# Patient Record
Sex: FEMALE | Race: WHITE | NOT HISPANIC OR LATINO | Employment: UNEMPLOYED | URBAN - METROPOLITAN AREA
[De-identification: names, ages, dates, MRNs, and addresses within clinical notes are randomized per-mention and may not be internally consistent; named-entity substitution may affect disease eponyms.]

---

## 2015-10-27 LAB — PAP RECOMMENDATION EXT: NORMAL

## 2019-05-28 LAB
ABO + RH BLD: NORMAL
HBV SURFACE AG SERPL QL IA: NEGATIVE
HCT VFR BLD AUTO: 40 % (ref 36–46)
HGB BLD-MCNC: 13 G/DL (ref 12–16)
HIV 1+2 AB+HIV1 P24 AG SERPL QL IA: NEGATIVE
INDIRECT COOMBS: NEGATIVE
RPR: NONREACTIVE
RUBELLA IMMUNE STATUS: NORMAL

## 2019-07-09 LAB
C TRACH RRNA SPEC QL PROBE: NEGATIVE
N GONORRHOEAE, AMPLIFIED DNA: NEGATIVE

## 2019-10-31 LAB
GLUCOSE SERPL-MCNC: 132 MG/DL
HCT VFR BLD AUTO: 34 % (ref 36–46)
HGB BLD-MCNC: 11 G/DL (ref 12–16)
INDIRECT COOMBS: NEGATIVE
RPR: NEGATIVE

## 2020-01-02 LAB — PRENATAL STREP B CULTURE: NEGATIVE

## 2020-01-08 ENCOUNTER — HOSPITAL ENCOUNTER (INPATIENT)
Facility: HOSPITAL | Age: 35
LOS: 2 days | Discharge: HOME | End: 2020-01-10
Attending: SPECIALIST | Admitting: SPECIALIST
Payer: COMMERCIAL

## 2020-01-08 DIAGNOSIS — Z37.9 NORMAL LABOR: ICD-10-CM

## 2020-01-08 LAB
ABO + RH BLD: NORMAL
AMPHET+METHAMPHET UR QL: NEGATIVE
BARBITURATES UR QL SCN>200 NG/ML: NEGATIVE
BASOPHILS # BLD AUTO: 0.05 K/UL (ref 0–0.2)
BASOPHILS NFR BLD: 0.3 % (ref 0–1.9)
BENZODIAZ UR QL SCN>200 NG/ML: NEGATIVE
BILIRUB UR QL STRIP: NEGATIVE
BLD GP AB SCN CELLS X3 SERPL QL: NORMAL
BZE UR QL SCN: NEGATIVE
CANNABINOIDS UR QL SCN: NEGATIVE
CLARITY UR: CLEAR
COLOR UR: COLORLESS
CREAT UR-MCNC: 17 MG/DL (ref 15–325)
CTP QC/QA: YES
DIFFERENTIAL METHOD: ABNORMAL
EOSINOPHIL # BLD AUTO: 0 K/UL (ref 0–0.5)
EOSINOPHIL NFR BLD: 0.2 % (ref 0–8)
ERYTHROCYTE [DISTWIDTH] IN BLOOD BY AUTOMATED COUNT: 14.1 % (ref 11.5–14.5)
GLUCOSE UR QL STRIP: NEGATIVE
HCT VFR BLD AUTO: 35.4 % (ref 37–48.5)
HGB BLD-MCNC: 11.4 G/DL (ref 12–16)
HGB UR QL STRIP: NEGATIVE
IMM GRANULOCYTES # BLD AUTO: 0.26 K/UL (ref 0–0.04)
IMM GRANULOCYTES NFR BLD AUTO: 1.6 % (ref 0–0.5)
KETONES UR QL STRIP: ABNORMAL
LEUKOCYTE ESTERASE UR QL STRIP: NEGATIVE
LYMPHOCYTES # BLD AUTO: 2.3 K/UL (ref 1–4.8)
LYMPHOCYTES NFR BLD: 14.3 % (ref 18–48)
MCH RBC QN AUTO: 27.7 PG (ref 27–31)
MCHC RBC AUTO-ENTMCNC: 32.2 G/DL (ref 32–36)
MCV RBC AUTO: 86 FL (ref 82–98)
MONOCYTES # BLD AUTO: 0.8 K/UL (ref 0.3–1)
MONOCYTES NFR BLD: 5 % (ref 4–15)
NEUTROPHILS # BLD AUTO: 12.7 K/UL (ref 1.8–7.7)
NEUTROPHILS NFR BLD: 78.6 % (ref 38–73)
NITRITE UR QL STRIP: NEGATIVE
NRBC BLD-RTO: 0 /100 WBC
OPIATES UR QL SCN: NEGATIVE
PCP UR QL SCN>25 NG/ML: NEGATIVE
PCP UR QL SCN>25 NG/ML: NEGATIVE
PH UR STRIP: 7 [PH] (ref 5–8)
PLATELET # BLD AUTO: 262 K/UL (ref 150–350)
PMV BLD AUTO: 11.4 FL (ref 9.2–12.9)
POC PH, VAGINAL: POSITIVE (ref 4.5–5.5)
PROT UR QL STRIP: NEGATIVE
RBC # BLD AUTO: 4.11 M/UL (ref 4–5.4)
RPR SER QL: NORMAL
RUPTURE OF MEMBRANE: POSITIVE
SP GR UR STRIP: 1 (ref 1–1.03)
TOXICOLOGY INFORMATION: NORMAL
URN SPEC COLLECT METH UR: ABNORMAL
UROBILINOGEN UR STRIP-ACNC: NEGATIVE EU/DL
WBC # BLD AUTO: 16.19 K/UL (ref 3.9–12.7)

## 2020-01-08 PROCEDURE — 80307 DRUG TEST PRSMV CHEM ANLYZR: CPT

## 2020-01-08 PROCEDURE — 12000002 HC ACUTE/MED SURGE SEMI-PRIVATE ROOM

## 2020-01-08 PROCEDURE — 25000003 PHARM REV CODE 250: Performed by: SPECIALIST

## 2020-01-08 PROCEDURE — 63600175 PHARM REV CODE 636 W HCPCS: Performed by: SPECIALIST

## 2020-01-08 PROCEDURE — 81003 URINALYSIS AUTO W/O SCOPE: CPT

## 2020-01-08 PROCEDURE — 86592 SYPHILIS TEST NON-TREP QUAL: CPT

## 2020-01-08 PROCEDURE — 86901 BLOOD TYPING SEROLOGIC RH(D): CPT

## 2020-01-08 PROCEDURE — 85025 COMPLETE CBC W/AUTO DIFF WBC: CPT

## 2020-01-08 RX ORDER — BUTORPHANOL TARTRATE 2 MG/ML
1 INJECTION INTRAMUSCULAR; INTRAVENOUS
Status: DISCONTINUED | OUTPATIENT
Start: 2020-01-08 | End: 2020-01-08

## 2020-01-08 RX ORDER — CETIRIZINE HYDROCHLORIDE 10 MG/1
10 TABLET ORAL DAILY
Status: ON HOLD | COMMUNITY
End: 2020-01-10 | Stop reason: HOSPADM

## 2020-01-08 RX ORDER — SODIUM CHLORIDE, SODIUM LACTATE, POTASSIUM CHLORIDE, CALCIUM CHLORIDE 600; 310; 30; 20 MG/100ML; MG/100ML; MG/100ML; MG/100ML
INJECTION, SOLUTION INTRAVENOUS CONTINUOUS
Status: DISCONTINUED | OUTPATIENT
Start: 2020-01-08 | End: 2020-01-08

## 2020-01-08 RX ORDER — ONDANSETRON 4 MG/1
8 TABLET, ORALLY DISINTEGRATING ORAL EVERY 8 HOURS PRN
Status: DISCONTINUED | OUTPATIENT
Start: 2020-01-08 | End: 2020-01-08

## 2020-01-08 RX ORDER — OXYTOCIN-SODIUM CHLORIDE 0.9% IV SOLN 30 UNIT/500ML 30-0.9/5 UT/ML-%
30 SOLUTION INTRAVENOUS ONCE
Status: COMPLETED | OUTPATIENT
Start: 2020-01-08 | End: 2020-01-08

## 2020-01-08 RX ORDER — CALCIUM CARBONATE 200(500)MG
500 TABLET,CHEWABLE ORAL 3 TIMES DAILY PRN
Status: DISCONTINUED | OUTPATIENT
Start: 2020-01-08 | End: 2020-01-08

## 2020-01-08 RX ORDER — BUTORPHANOL TARTRATE 2 MG/ML
2 INJECTION INTRAMUSCULAR; INTRAVENOUS
Status: DISCONTINUED | OUTPATIENT
Start: 2020-01-08 | End: 2020-01-08

## 2020-01-08 RX ORDER — FAMOTIDINE 20 MG/1
20 TABLET, FILM COATED ORAL 2 TIMES DAILY
Status: ON HOLD | COMMUNITY
End: 2020-01-10 | Stop reason: HOSPADM

## 2020-01-08 RX ORDER — SODIUM CHLORIDE 9 MG/ML
INJECTION, SOLUTION INTRAVENOUS
Status: DISCONTINUED | OUTPATIENT
Start: 2020-01-08 | End: 2020-01-08

## 2020-01-08 RX ORDER — OXYCODONE AND ACETAMINOPHEN 10; 325 MG/1; MG/1
1 TABLET ORAL EVERY 4 HOURS PRN
Status: DISCONTINUED | OUTPATIENT
Start: 2020-01-08 | End: 2020-01-10 | Stop reason: HOSPADM

## 2020-01-08 RX ORDER — ONDANSETRON 4 MG/1
8 TABLET, ORALLY DISINTEGRATING ORAL EVERY 8 HOURS PRN
Status: DISCONTINUED | OUTPATIENT
Start: 2020-01-08 | End: 2020-01-10 | Stop reason: HOSPADM

## 2020-01-08 RX ORDER — DIPHENHYDRAMINE HYDROCHLORIDE 50 MG/ML
25 INJECTION INTRAMUSCULAR; INTRAVENOUS EVERY 4 HOURS PRN
Status: DISCONTINUED | OUTPATIENT
Start: 2020-01-08 | End: 2020-01-10 | Stop reason: HOSPADM

## 2020-01-08 RX ORDER — OXYTOCIN-SODIUM CHLORIDE 0.9% IV SOLN 30 UNIT/500ML 30-0.9/5 UT/ML-%
2 SOLUTION INTRAVENOUS CONTINUOUS
Status: DISCONTINUED | OUTPATIENT
Start: 2020-01-08 | End: 2020-01-08

## 2020-01-08 RX ORDER — HYDROCORTISONE 25 MG/G
CREAM TOPICAL 3 TIMES DAILY PRN
Status: DISCONTINUED | OUTPATIENT
Start: 2020-01-08 | End: 2020-01-10 | Stop reason: HOSPADM

## 2020-01-08 RX ORDER — SIMETHICONE 80 MG
1 TABLET,CHEWABLE ORAL 4 TIMES DAILY PRN
Status: DISCONTINUED | OUTPATIENT
Start: 2020-01-08 | End: 2020-01-08

## 2020-01-08 RX ORDER — DOCUSATE SODIUM 100 MG/1
200 CAPSULE, LIQUID FILLED ORAL 2 TIMES DAILY PRN
Status: DISCONTINUED | OUTPATIENT
Start: 2020-01-08 | End: 2020-01-10 | Stop reason: HOSPADM

## 2020-01-08 RX ORDER — PROMETHAZINE HYDROCHLORIDE 25 MG/1
25 TABLET ORAL EVERY 6 HOURS PRN
Status: DISCONTINUED | OUTPATIENT
Start: 2020-01-08 | End: 2020-01-10 | Stop reason: HOSPADM

## 2020-01-08 RX ORDER — DIPHENHYDRAMINE HCL 25 MG
25 CAPSULE ORAL EVERY 4 HOURS PRN
Status: DISCONTINUED | OUTPATIENT
Start: 2020-01-08 | End: 2020-01-10 | Stop reason: HOSPADM

## 2020-01-08 RX ORDER — OXYCODONE AND ACETAMINOPHEN 5; 325 MG/1; MG/1
1 TABLET ORAL EVERY 4 HOURS PRN
Status: DISCONTINUED | OUTPATIENT
Start: 2020-01-08 | End: 2020-01-10 | Stop reason: HOSPADM

## 2020-01-08 RX ADMIN — SODIUM CHLORIDE, SODIUM LACTATE, POTASSIUM CHLORIDE, AND CALCIUM CHLORIDE: .6; .31; .03; .02 INJECTION, SOLUTION INTRAVENOUS at 05:01

## 2020-01-08 RX ADMIN — Medication 2 MILLI-UNITS/MIN: at 05:01

## 2020-01-08 RX ADMIN — IBUPROFEN 600 MG: 400 TABLET ORAL at 09:01

## 2020-01-08 RX ADMIN — Medication 30 UNITS: at 09:01

## 2020-01-08 NOTE — NURSING
1440:  Patient desires ncb, would like as little intervention and as much mobility at possible.  Magalis portable monitor not tracing well.  Notified Dr. Nunez of pt request.  Per Dr. Nunez patient may sit on labor ball at bedside to obtain 30min of tracing, then may do intermittent monitoring.      1552: discussed use of pitocin for augmentation of labor with patient.  Pt does not want pitocin at this time.      1515:  Risks and benefits of starting pitocin discussed with patient.  Pt desires to start pitocin with slow titration.  MD updated on POC.

## 2020-01-09 LAB
BASOPHILS # BLD AUTO: 0.04 K/UL (ref 0–0.2)
BASOPHILS NFR BLD: 0.2 % (ref 0–1.9)
DIFFERENTIAL METHOD: ABNORMAL
EOSINOPHIL # BLD AUTO: 0 K/UL (ref 0–0.5)
EOSINOPHIL NFR BLD: 0.1 % (ref 0–8)
ERYTHROCYTE [DISTWIDTH] IN BLOOD BY AUTOMATED COUNT: 14.1 % (ref 11.5–14.5)
HCT VFR BLD AUTO: 31.3 % (ref 37–48.5)
HGB BLD-MCNC: 9.9 G/DL (ref 12–16)
IMM GRANULOCYTES # BLD AUTO: 0.19 K/UL (ref 0–0.04)
IMM GRANULOCYTES NFR BLD AUTO: 0.9 % (ref 0–0.5)
LYMPHOCYTES # BLD AUTO: 2.3 K/UL (ref 1–4.8)
LYMPHOCYTES NFR BLD: 11.4 % (ref 18–48)
MCH RBC QN AUTO: 27.7 PG (ref 27–31)
MCHC RBC AUTO-ENTMCNC: 31.6 G/DL (ref 32–36)
MCV RBC AUTO: 87 FL (ref 82–98)
MONOCYTES # BLD AUTO: 1.1 K/UL (ref 0.3–1)
MONOCYTES NFR BLD: 5.5 % (ref 4–15)
NEUTROPHILS # BLD AUTO: 16.7 K/UL (ref 1.8–7.7)
NEUTROPHILS NFR BLD: 81.9 % (ref 38–73)
NRBC BLD-RTO: 0 /100 WBC
PLATELET # BLD AUTO: 229 K/UL (ref 150–350)
PMV BLD AUTO: 11.2 FL (ref 9.2–12.9)
RBC # BLD AUTO: 3.58 M/UL (ref 4–5.4)
WBC # BLD AUTO: 20.34 K/UL (ref 3.9–12.7)

## 2020-01-09 PROCEDURE — 63600175 PHARM REV CODE 636 W HCPCS: Performed by: SPECIALIST

## 2020-01-09 PROCEDURE — 36415 COLL VENOUS BLD VENIPUNCTURE: CPT

## 2020-01-09 PROCEDURE — 25000003 PHARM REV CODE 250: Performed by: SPECIALIST

## 2020-01-09 PROCEDURE — 12000002 HC ACUTE/MED SURGE SEMI-PRIVATE ROOM

## 2020-01-09 PROCEDURE — 72200004 HC VAGINAL DELIVERY LEVEL I

## 2020-01-09 PROCEDURE — 85025 COMPLETE CBC W/AUTO DIFF WBC: CPT

## 2020-01-09 RX ORDER — SIMETHICONE 80 MG
1 TABLET,CHEWABLE ORAL 3 TIMES DAILY PRN
Status: DISCONTINUED | OUTPATIENT
Start: 2020-01-09 | End: 2020-01-10 | Stop reason: HOSPADM

## 2020-01-09 RX ADMIN — OXYCODONE HYDROCHLORIDE AND ACETAMINOPHEN 1 TABLET: 10; 325 TABLET ORAL at 06:01

## 2020-01-09 RX ADMIN — ALUMINUM HYDROXIDE, MAGNESIUM HYDROXIDE, AND SIMETHICONE 50 ML: 200; 200; 20 SUSPENSION ORAL at 10:01

## 2020-01-09 RX ADMIN — BENZOCAINE AND LEVOMENTHOL: 200; 5 SPRAY TOPICAL at 12:01

## 2020-01-09 RX ADMIN — DOCUSATE SODIUM 200 MG: 100 CAPSULE, LIQUID FILLED ORAL at 10:01

## 2020-01-09 RX ADMIN — OXYCODONE AND ACETAMINOPHEN 1 TABLET: 5; 325 TABLET ORAL at 05:01

## 2020-01-09 RX ADMIN — IBUPROFEN 600 MG: 400 TABLET ORAL at 05:01

## 2020-01-09 RX ADMIN — Medication: at 12:01

## 2020-01-09 RX ADMIN — IBUPROFEN 600 MG: 400 TABLET ORAL at 10:01

## 2020-01-09 RX ADMIN — IBUPROFEN 600 MG: 400 TABLET ORAL at 03:01

## 2020-01-09 NOTE — NURSING
Medicated with Oxycodone 10mg/325mg.Patient sitting up in bed. Reports that she no longer feels pain in chest at this time.Will continue to monitor.

## 2020-01-09 NOTE — NURSING
"Patient reports pain in chest.Also c/o abdominal cramping.She states"It feels like you didn't swallow a pill".Denies SOB. BP= 112/77  P=100 Pulse OX =98% Respirations=20. Oncoming nurse aware.  "

## 2020-01-09 NOTE — PLAN OF CARE
Patient awake/alert. Medicated x 1 this shift for pain. Bleeding light. Ambulating and voidng without difficulty. Plan of care continued.

## 2020-01-09 NOTE — L&D DELIVERY NOTE
[facility]  Vaginal Delivery Note    SUMMARY     Patient underwent normal spontaneous vaginal delivery over an intact perineum with first-degree vaginal laceration with repair.  The placenta was delivered intact and spontaneous.  The uterus and vagina were swept clean. The patient had an epidural for anesthesia . The estimated blood loss was 200 cc.  No other complications were noted. Baby delivered was a female infant with Apgar 8 9. .  Patient tolerated delivery well. Sponge needle and lap counted correctly x2. Both mother and baby are in stable condition.        Indications: Vaginal delivery  Pregnancy complicated by:   Patient Active Problem List   Diagnosis    Normal labor    Vaginal delivery     Admitting GA: 37w4d

## 2020-01-09 NOTE — PROGRESS NOTES
"  Postpartum Day 1 Vaginal Delivery    Bernarda Chandler is doing well without complaints. She denies any problems with pp blues, is ambulating well, is voiding without difficulty, and states bleeding is not heavy. Her pain level is being controlled with pain meds.      OBJECTIVE:     Vital Signs (Most Recent):  /72 (BP Location: Left arm, Patient Position: Sitting)   Pulse 84   Temp 98 °F (36.7 °C) (Oral)   Resp 18   Ht 5' 1" (1.549 m)   Wt 76.2 kg (168 lb)   Breastfeeding? Yes   BMI 31.74 kg/m²       Vital Signs Range (Last 24H):  Reviewed    I & O (Last 24H):  Intake/Output Summary (Last 24 hours)    Intake/Output Summary (Last 24 hours) at 1/9/2020 1333  Last data filed at 1/9/2020 0322  Gross per 24 hour   Intake 75 ml   Output 1550 ml   Net -1475 ml         Physical Exam:    Abd: soft non tender, fundus firm   Ext: negative radha's sign, minimal edema  Lochia: appropriate amount rubra      CBC:   Lab Results   Component Value Date/Time    WBC 20.34 (H) 01/09/2020 04:17 AM    RBC 3.58 (L) 01/09/2020 04:17 AM    HGB 9.9 (L) 01/09/2020 04:17 AM    HCT 31.3 (L) 01/09/2020 04:17 AM     01/09/2020 04:17 AM    MCV 87 01/09/2020 04:17 AM    MCH 27.7 01/09/2020 04:17 AM    MCHC 31.6 (L) 01/09/2020 04:17 AM       ABO/Rh  O POS          ASSESSMENT/PLAN:     S/p vaginal delivery   Patient Active Problem List   Diagnosis    Normal labor    Vaginal delivery        Routine postpartal care     "

## 2020-01-09 NOTE — LACTATION NOTE
This note was copied from a baby's chart.  Mom reports breastfeeding well. Denies any ?/concerns or assistance @ this time. Dicussed feeding cues & feeding frequency (8-12 times in 24 hours) Assistance offered prn. Mom verbalized understanding

## 2020-01-09 NOTE — PLAN OF CARE
Met with patient to complete OB initial discharge planning assessment. Patient lives with , 4 children and now baby. Patient has access to a car seat and plans to breast  feed. Patient has access to diapers and understands process of how to apply for WIC. Patient stated they have no needs from /case management staff at this time.       01/09/20 1406   Discharge Assessment   Assessment Type Discharge Planning Assessment   Confirmed/corrected address and phone number on facesheet? Yes   Assessment information obtained from? Patient   Current cognitive status: Alert/Oriented   Current Functional Status: Independent   Lives With spouse;child(dasia), dependent   Able to Return to Prior Arrangements yes   Is patient able to care for self after discharge? Yes   Patient currently being followed by outpatient case management? No   Patient currently receives any other outside agency services? No   Do you have any problems affording any of your prescribed medications? No   Does the patient have transportation home? Yes   Transportation Anticipated family or friend will provide   Does the patient receive services at the [facility]? No   Discharge Plan A Home   Discharge Plan B Home   Patient/Family in Agreement with Plan yes

## 2020-01-10 VITALS
TEMPERATURE: 99 F | WEIGHT: 168 LBS | DIASTOLIC BLOOD PRESSURE: 85 MMHG | SYSTOLIC BLOOD PRESSURE: 124 MMHG | BODY MASS INDEX: 31.72 KG/M2 | HEART RATE: 76 BPM | HEIGHT: 61 IN | OXYGEN SATURATION: 97 % | RESPIRATION RATE: 18 BRPM

## 2020-01-10 PROCEDURE — 25000003 PHARM REV CODE 250: Performed by: SPECIALIST

## 2020-01-10 RX ORDER — IBUPROFEN 600 MG/1
600 TABLET ORAL EVERY 6 HOURS PRN
Refills: 0
Start: 2020-01-10 | End: 2022-04-21

## 2020-01-10 RX ADMIN — IBUPROFEN 600 MG: 400 TABLET ORAL at 08:01

## 2020-01-10 NOTE — PROGRESS NOTES
"                                                                                     Postpartum Day 2 Vaginal Delivery    Bernarda Chandler is doing well without complaints. She denies any problems with pp blues, is ambulating well, is voiding without difficulty, and states bleeding is not heavy. Her pain level is being controlled with pain meds.      OBJECTIVE:     Vital Signs (Most Recent):  /83 (BP Location: Left arm, Patient Position: Sitting)   Pulse 82   Temp 98.5 °F (36.9 °C) (Oral)   Resp 18   Ht 5' 1" (1.549 m)   Wt 76.2 kg (168 lb)   SpO2 97%   Breastfeeding? Yes   BMI 31.74 kg/m²       Vital Signs Range (Last 24H):  Reviewed    I & O (Last 24H):  Intake/Output Summary (Last 24 hours)  No intake or output data in the 24 hours ending 01/10/20 1009      Physical Exam:    Abd: soft non tender, fundus firm   Ext: negative radha's sign, minimal edema  Lochia: appropriate amount rubra  Perineum: healing well    CBC:   Lab Results   Component Value Date/Time    WBC 20.34 (H) 01/09/2020 04:17 AM    RBC 3.58 (L) 01/09/2020 04:17 AM    HGB 9.9 (L) 01/09/2020 04:17 AM    HCT 31.3 (L) 01/09/2020 04:17 AM     01/09/2020 04:17 AM    MCV 87 01/09/2020 04:17 AM    MCH 27.7 01/09/2020 04:17 AM    MCHC 31.6 (L) 01/09/2020 04:17 AM       ABO/Rh  O POS     S/p vag delivery   Patient Active Problem List   Diagnosis    Normal labor    Vaginal delivery          D/c home  See discharge orders  Follow up 4-6 weeks  Emergency room precautions  "

## 2020-01-10 NOTE — LACTATION NOTE
"States, "she is doing really well best I can tell". Denies any pain/discomfor/questions/concerns at present. History of breastfeeding first child x 1 year. Offered assist with breastfeeding/concerns/questions as needed/desired. V/u. Will f/u prn.   "

## 2020-01-10 NOTE — DISCHARGE INSTRUCTIONS
Pelvic rest for 6 weeks (no sex, tampons, douching, nothing in the vagina)    You can experience vaginal bleeding on and off for up to 6 weeks, it will gradually get lighter and the color will change from bright red to a brownish discharge towards the end.    Activity:  NO strenuous activities or exercising.  Do not /lift anything over 15 pounds.   Do not do heavy housework or cleaning.    NO driving for 3 days.  You may take short car trips but do not drive.    You may shower and/or soak in a bathtub, both are acceptable.  Use a mild soap, no heavy perfumes or fragrances to avoid irritation.     If constipation develops:  You may take Colace (stool softener), Milk of Magnesia, Dulcolax or Miralax.  All of these medications are sold over the counter.      Care of Episiotomy:  Local agents such as Tucks pads & Dermoplast spray.  You may also use a Sitz bath: sitting in a tub of warm water for 15 minutes 2-3 times per day will help relieve the discomfort.      Pain Relief:  You may take Motrin for mild pain & uterine cramping.      Emotional Changes:  You may experience baby blues after delivery.  You may feel let down, anxious and cry easily.  This is normal.  These feelings can begin 2-3 days after delivery and usually disappear in about a week or two.  Prolonged sadness may indicate postpartum depression.      Call your doctor for any of the following:  Difficulty breathing, problems with any of your medications, inability to eat.    Foul smelling vaginal discharge.  Temperature above 100.4.    Heavy vaginal bleeding.  All women bleed different after delivery and each delivery is different.  Heavy bleeding consists of saturating a kaylie pad in a 1 hour time period.  Passing clots are normal, if you pass a blood clot larger than the size of a golf ball call your doctor's office.   If you experience pain in your legs/calves, if one leg increases in size and becomes swollen or becomes hot to touch or  discolored.   Crying or periods of sadness beyond 2 weeks.      If you are breast feeding:  Wash your breasts with mild soap and warm water.  You should wear a supportive bra.  You should continue to take a prenatal vitamin for 6 weeks or until breastfeeding is discontinued.  If nipples are sore, apply a few drops of breast milk after nursing and let air dry or you can use Lanolin cream.   If breasts are engorged, apply warmth and express milk.           Breastfeeding Discharge Instructions       [facility] [phone]     American Academy of Pediatrics recommends exclusive breastfeeding for the first 6 months of life and continued breastfeeding with the introduction of supplemental foods beyond the first year of life.   The World Health Organization and the American Academy of Pediatrics recommend to delay all bottle and pacifier use until after 4 weeks of age and breastfeeding is well established.  American Academy of Pediatrics does recommend the use of a pacifier at naptime and bedtime, as a SIDS Reduction strategy, for  newborns only after 1 month of age and breastfeeding has been firmly established.    Feed the baby at the earliest sign of hunger or comfort  o Hands to mouth, sucking motions  o Rooting or searching for something to suck on  o Dont wait for crying - it is a not a late sign of hunger; it is a sign of distress     The feedings may be 8-12 times per 24hrs and will not follow a schedule   Alternate the breast you start the feeding with, or start with the breast that feels the fullest   Switch breasts when the baby takes himself off the breast or falls asleep   Keep offering breasts until the baby looks full, no longer gives hunger signs, and stays asleep when placed on his back in the crib   If the baby is sleepy and wont wake for a feeding, put the baby skin-to-skin dressed in a diaper against the mothers bare chest   Sleep near  your baby   The baby should be positioned and latched on to the breast correctly  o Chest-to-chest, chin in the breast  o Babys lips are flipped outward  o Babys mouth is stretched open wide like a shout  o Babys sucking should feel like tugging to the mother  - The baby should be drinking at the breast:  o You should hear swallowing or gulping throughout the feeding  o You should see milk on the babys lips when he comes off the breast  o Your breasts should be softer when the baby is finished feeding  o The baby should look relaxed at the end of feedings  o After the 4th day and your milk is in:  o The babys poop should turn bright yellow and be loose, watery, and seedy  o The baby should have at least 3-4 poops the size of the palm of your hand per day  o The baby should have at least 6-8 wet diapers per day  o The urine should be light yellow in color  You should drink when you are thirsty and eat a healthy diet when you are    hungry.     Take naps to get the rest you need.   Take medications and/or drink alcohol only with permission of your obstetrician    or the babys pediatrician.  You can also call the [facility],   ([phone]), Monday-Friday, 8am-5pm Central time, to get the most   up-to-date evidence-based information on the use of medications during   pregnancy and breastfeeding.      The baby should be examined by a pediatrician at 3-5 days of age; unless ordered sooner by the pediatrician.  Once your milk comes in, the baby should be back to birth weight no later than 10-14 days of age.    If your having problems with breastfeeding or have any questions regarding breastfeeding- call [facility] Breastfeeding Support services [phone] Monday- Friday 9 am-5 pm

## 2020-01-10 NOTE — DISCHARGE SUMMARY
[facility]  Obstetrics  Discharge Summary      Patient Name: Bernarda Chandler  MRN: [id number]  Admission Date: 1/8/2020  Hospital Length of Stay: 2 days  Discharge Date and Time:  01/10/2020 10:11 AM  Attending Physician: Manuelito Tierney MD   Discharging Provider: Manuelito Tierney MD   Primary Care Provider: Primary Doctor No    HPI: Patient at term presents in labor        * No surgery found *     Hospital Course:   Patient did well during her postpartum stay     Consults (From admission, onward)        Status Ordering Provider     Consult to Lactation  Use PRN     Provider:  (Not yet assigned)    Acknowledged MANUELITO TIERNEY          Final Active Diagnoses:    Diagnosis Date Noted POA    PRINCIPAL PROBLEM:  Vaginal delivery [O80] 01/08/2020 Not Applicable    Normal labor [O80, Z37.9] 01/08/2020 Not Applicable      Problems Resolved During this Admission:        Labs:   CBC   Recent Labs   Lab 01/08/20  1430 01/09/20  0417   WBC 16.19* 20.34*   HGB 11.4* 9.9*   HCT 35.4* 31.3*    229       Feeding Method: breast    Immunizations     Date Immunization Status Dose Route/Site Given by    01/08/20 2352 MMR Incomplete 0.5 mL Subcutaneous/Left deltoid     01/08/20 2352 Tdap Incomplete 0.5 mL Intramuscular/Left deltoid           Delivery:    Episiotomy: None   Lacerations: 1st   Repair suture:     Repair # of packets: 1   Blood loss (ml): 150     Birth information:  YOB: 2018   Time of birth: 7:24 PM   Sex: female   Delivery type: Vaginal, Spontaneous   Gestational Age: 37w4d    Delivery Clinician:      Other providers:       Additional  information:  Forceps:    Vacuum:    Breech:    Observed anomalies      Living?:           APGARS  One minute Five minutes Ten minutes   Skin color:         Heart rate:         Grimace:         Muscle tone:         Breathing:         Totals: 9  9        Placenta: Delivered:       appearance    Pending Diagnostic Studies:     None          Discharged  Condition: good    Disposition: Home or Self Care    Follow Up:  Follow-up Information     Manuelito Nunez MD In 6 weeks.    Specialty:  Obstetrics  Contact information:  [address] [facility]  [place] [facility] [id number] [phone]                 Patient Instructions:   No discharge procedures on file.  Medications:  Current Discharge Medication List      CONTINUE these medications which have NOT CHANGED    Details   cetirizine (ZYRTEC) 10 MG tablet Take 10 mg by mouth once daily.      famotidine (PEPCID) 20 MG tablet Take 20 mg by mouth 2 (two) times daily.      prenatal vit/iron fum/folic ac (PRENATAL 1+1 ORAL) Take 1 tablet by mouth.             Manuelito Nunez MD  Obstetrics  [facility]

## 2020-04-13 PROBLEM — Z37.9 NORMAL LABOR: Status: RESOLVED | Noted: 2020-01-08 | Resolved: 2020-04-13

## 2022-04-21 ENCOUNTER — OFFICE VISIT (OUTPATIENT)
Dept: FAMILY MEDICINE | Facility: CLINIC | Age: 37
End: 2022-04-21
Payer: COMMERCIAL

## 2022-04-21 VITALS
OXYGEN SATURATION: 99 % | DIASTOLIC BLOOD PRESSURE: 64 MMHG | TEMPERATURE: 99 F | SYSTOLIC BLOOD PRESSURE: 102 MMHG | BODY MASS INDEX: 25.1 KG/M2 | WEIGHT: 132.94 LBS | HEART RATE: 80 BPM | HEIGHT: 61 IN

## 2022-04-21 DIAGNOSIS — Z00.00 WELLNESS EXAMINATION: Primary | ICD-10-CM

## 2022-04-21 DIAGNOSIS — M25.50 GENERALIZED JOINT PAIN: ICD-10-CM

## 2022-04-21 DIAGNOSIS — R53.83 FATIGUE, UNSPECIFIED TYPE: ICD-10-CM

## 2022-04-21 DIAGNOSIS — Z76.89 ENCOUNTER TO ESTABLISH CARE: ICD-10-CM

## 2022-04-21 PROCEDURE — 3008F PR BODY MASS INDEX (BMI) DOCUMENTED: ICD-10-PCS | Mod: CPTII,S$GLB,, | Performed by: NURSE PRACTITIONER

## 2022-04-21 PROCEDURE — 1160F PR REVIEW ALL MEDS BY PRESCRIBER/CLIN PHARMACIST DOCUMENTED: ICD-10-PCS | Mod: CPTII,S$GLB,, | Performed by: NURSE PRACTITIONER

## 2022-04-21 PROCEDURE — 99204 OFFICE O/P NEW MOD 45 MIN: CPT | Mod: 25,S$GLB,, | Performed by: NURSE PRACTITIONER

## 2022-04-21 PROCEDURE — 3078F DIAST BP <80 MM HG: CPT | Mod: CPTII,S$GLB,, | Performed by: NURSE PRACTITIONER

## 2022-04-21 PROCEDURE — 1159F PR MEDICATION LIST DOCUMENTED IN MEDICAL RECORD: ICD-10-PCS | Mod: CPTII,S$GLB,, | Performed by: NURSE PRACTITIONER

## 2022-04-21 PROCEDURE — 1160F RVW MEDS BY RX/DR IN RCRD: CPT | Mod: CPTII,S$GLB,, | Performed by: NURSE PRACTITIONER

## 2022-04-21 PROCEDURE — 3074F SYST BP LT 130 MM HG: CPT | Mod: CPTII,S$GLB,, | Performed by: NURSE PRACTITIONER

## 2022-04-21 PROCEDURE — 1159F MED LIST DOCD IN RCRD: CPT | Mod: CPTII,S$GLB,, | Performed by: NURSE PRACTITIONER

## 2022-04-21 PROCEDURE — 99999 PR PBB SHADOW E&M-NEW PATIENT-LVL III: CPT | Mod: PBBFAC,,, | Performed by: NURSE PRACTITIONER

## 2022-04-21 PROCEDURE — 99204 PR OFFICE/OUTPT VISIT, NEW, LEVL IV, 45-59 MIN: ICD-10-PCS | Mod: 25,S$GLB,, | Performed by: NURSE PRACTITIONER

## 2022-04-21 PROCEDURE — 3078F PR MOST RECENT DIASTOLIC BLOOD PRESSURE < 80 MM HG: ICD-10-PCS | Mod: CPTII,S$GLB,, | Performed by: NURSE PRACTITIONER

## 2022-04-21 PROCEDURE — 3008F BODY MASS INDEX DOCD: CPT | Mod: CPTII,S$GLB,, | Performed by: NURSE PRACTITIONER

## 2022-04-21 PROCEDURE — 3074F PR MOST RECENT SYSTOLIC BLOOD PRESSURE < 130 MM HG: ICD-10-PCS | Mod: CPTII,S$GLB,, | Performed by: NURSE PRACTITIONER

## 2022-04-21 PROCEDURE — 99999 PR PBB SHADOW E&M-NEW PATIENT-LVL III: ICD-10-PCS | Mod: PBBFAC,,, | Performed by: NURSE PRACTITIONER

## 2022-04-21 NOTE — PROGRESS NOTES
Subjective:       Patient ID: Bernarda Chandler is a 36 y.o. female.    Chief Complaint: Establish Care    Healther Geraldine presents to the clinic today to establish care  She has only been seen by her OBGYN Dr. Nunez in [place]  She denies any significant health history   She was prompted to be seen today for reports of increasing generalized joint pains that have been increasing over the past few months. She denies any injury or trauma. Denies any changes to lifestyle or activity. Reports she stays relatively active as she has 5 children.   She reports that initially she noticed pain to her right elbow- thought she had overused it and thought nothing of it, progressively the dull aching has moved to her fingers bilaterally, bilateral wrists, ankles, and knees. She reports that it may not always be every joint at one time. Sometimes she will wake up in the morning hurting any other times she will be fine when she wakes up and the more she moves/does the pain will gradually develop. She denies any warmth or swelling to any of these joints. She denies any response to heat/ice. She is only using otc Tylenol/Ibuprofen as needed.     Review of Systems   Constitutional: Negative.    HENT: Negative.    Eyes: Negative.    Respiratory: Negative for chest tightness, shortness of breath and wheezing.    Cardiovascular: Negative for chest pain, palpitations and leg swelling.   Gastrointestinal: Negative.    Endocrine: Negative.    Musculoskeletal: Positive for arthralgias.   Skin: Negative.    Allergic/Immunologic: Negative.    Neurological: Negative for dizziness, weakness and numbness.   Hematological: Negative for adenopathy. Does not bruise/bleed easily.   Psychiatric/Behavioral: Negative.        Patient Active Problem List   Diagnosis   (none) - all problems resolved or deleted       Objective:      Physical Exam  Vitals and nursing note reviewed.   Constitutional:       General: She is not in acute distress.      "Appearance: Normal appearance.   HENT:      Head: Normocephalic and atraumatic.      Right Ear: Tympanic membrane normal.      Left Ear: Tympanic membrane normal.      Nose: Nose normal.      Mouth/Throat:      Mouth: Mucous membranes are moist.   Eyes:      Conjunctiva/sclera: Conjunctivae normal.      Pupils: Pupils are equal, round, and reactive to light.   Cardiovascular:      Rate and Rhythm: Normal rate and regular rhythm.      Heart sounds: Normal heart sounds. No murmur heard.  Pulmonary:      Effort: Pulmonary effort is normal. No respiratory distress.      Breath sounds: Normal breath sounds. No wheezing.   Abdominal:      General: Bowel sounds are normal. There is no distension.      Tenderness: There is no abdominal tenderness.   Musculoskeletal:         General: No deformity. Normal range of motion.      Right lower leg: No edema.      Left lower leg: No edema.   Skin:     General: Skin is warm and dry.      Capillary Refill: Capillary refill takes less than 2 seconds.      Findings: No rash.   Neurological:      General: No focal deficit present.      Mental Status: She is alert.   Psychiatric:         Attention and Perception: Attention and perception normal.         Mood and Affect: Mood normal.         Speech: Speech normal.         Behavior: Behavior normal.         Lab Results   Component Value Date    WBC 20.34 (H) 01/09/2020    HGB 9.9 (L) 01/09/2020    HCT 31.3 (L) 01/09/2020     01/09/2020     The ASCVD Risk score (Ace AMEYA Barbosa., et al., 2013) failed to calculate for the following reasons:    The 2013 ASCVD risk score is only valid for ages 40 to 79  Visit Vitals  /64 (BP Location: Left arm, Patient Position: Sitting, BP Method: Large (Manual))   Pulse 80   Temp 98.5 °F (36.9 °C) (Oral)   Ht 5' 1" (1.549 m)   Wt 60.3 kg (132 lb 15 oz)   SpO2 99%   BMI 25.12 kg/m²      Assessment:       1. Wellness examination    2. Encounter to establish care    3. Generalized joint pain    4. " Fatigue, unspecified type        Plan:       Bernarda was seen today for establish care.    Diagnoses and all orders for this visit:    Encounter to establish care  Obtain fasting blood work fore review.   Generalized joint pain  -     Uric Acid; Future  -     Rheumatoid Factor; Future  -     Sedimentation rate; Future  Maintain physical activity  Topicals- Voltaren, Biofreeze   Aleve bid with food   Wellness examination  -     Lipid Panel; Future  -     Comprehensive Metabolic Panel; Future    Fatigue, unspecified type  -     CBC Auto Differential; Future  -     TSH; Future      No follow-ups on file.      Future Appointments     Date Provider Specialty Appt Notes    4/22/2022  Lab fasting lab     5/6/2022 Cate Jara NP Family Medicine 2 week follow up

## 2022-04-22 ENCOUNTER — LAB VISIT (OUTPATIENT)
Dept: LAB | Facility: CLINIC | Age: 37
End: 2022-04-22
Payer: COMMERCIAL

## 2022-04-22 DIAGNOSIS — M25.50 GENERALIZED JOINT PAIN: ICD-10-CM

## 2022-04-22 DIAGNOSIS — Z00.00 WELLNESS EXAMINATION: ICD-10-CM

## 2022-04-22 DIAGNOSIS — R53.83 FATIGUE, UNSPECIFIED TYPE: ICD-10-CM

## 2022-04-22 LAB
ALBUMIN SERPL BCP-MCNC: 4 G/DL (ref 3.5–5.2)
ALP SERPL-CCNC: 51 U/L (ref 55–135)
ALT SERPL W/O P-5'-P-CCNC: 19 U/L (ref 10–44)
ANION GAP SERPL CALC-SCNC: 10 MMOL/L (ref 8–16)
AST SERPL-CCNC: 18 U/L (ref 10–40)
BASOPHILS # BLD AUTO: 0.04 K/UL (ref 0–0.2)
BASOPHILS NFR BLD: 0.6 % (ref 0–1.9)
BILIRUB SERPL-MCNC: 0.4 MG/DL (ref 0.1–1)
BUN SERPL-MCNC: 11 MG/DL (ref 6–20)
CALCIUM SERPL-MCNC: 9.5 MG/DL (ref 8.7–10.5)
CHLORIDE SERPL-SCNC: 104 MMOL/L (ref 95–110)
CHOLEST SERPL-MCNC: 144 MG/DL (ref 120–199)
CHOLEST/HDLC SERPL: 2.4 {RATIO} (ref 2–5)
CO2 SERPL-SCNC: 24 MMOL/L (ref 23–29)
CREAT SERPL-MCNC: 0.8 MG/DL (ref 0.5–1.4)
DIFFERENTIAL METHOD: ABNORMAL
EOSINOPHIL # BLD AUTO: 0.1 K/UL (ref 0–0.5)
EOSINOPHIL NFR BLD: 2.2 % (ref 0–8)
ERYTHROCYTE [DISTWIDTH] IN BLOOD BY AUTOMATED COUNT: 12.5 % (ref 11.5–14.5)
ERYTHROCYTE [SEDIMENTATION RATE] IN BLOOD BY WESTERGREN METHOD: 5 MM/HR (ref 0–20)
EST. GFR  (AFRICAN AMERICAN): >60 ML/MIN/1.73 M^2
EST. GFR  (NON AFRICAN AMERICAN): >60 ML/MIN/1.73 M^2
GLUCOSE SERPL-MCNC: 85 MG/DL (ref 70–110)
HCT VFR BLD AUTO: 39.8 % (ref 37–48.5)
HDLC SERPL-MCNC: 61 MG/DL (ref 40–75)
HDLC SERPL: 42.4 % (ref 20–50)
HGB BLD-MCNC: 13.2 G/DL (ref 12–16)
IMM GRANULOCYTES # BLD AUTO: 0.01 K/UL (ref 0–0.04)
IMM GRANULOCYTES NFR BLD AUTO: 0.2 % (ref 0–0.5)
LDLC SERPL CALC-MCNC: 74.6 MG/DL (ref 63–159)
LYMPHOCYTES # BLD AUTO: 2.2 K/UL (ref 1–4.8)
LYMPHOCYTES NFR BLD: 34.2 % (ref 18–48)
MCH RBC QN AUTO: 29.9 PG (ref 27–31)
MCHC RBC AUTO-ENTMCNC: 33.2 G/DL (ref 32–36)
MCV RBC AUTO: 90 FL (ref 82–98)
MONOCYTES # BLD AUTO: 0.5 K/UL (ref 0.3–1)
MONOCYTES NFR BLD: 7.4 % (ref 4–15)
NEUTROPHILS # BLD AUTO: 3.5 K/UL (ref 1.8–7.7)
NEUTROPHILS NFR BLD: 55.4 % (ref 38–73)
NONHDLC SERPL-MCNC: 83 MG/DL
NRBC BLD-RTO: 0 /100 WBC
PLATELET # BLD AUTO: 216 K/UL (ref 150–450)
PLATELET BLD QL SMEAR: ABNORMAL
PMV BLD AUTO: 11.7 FL (ref 9.2–12.9)
POTASSIUM SERPL-SCNC: 4.2 MMOL/L (ref 3.5–5.1)
PROT SERPL-MCNC: 7.1 G/DL (ref 6–8.4)
RBC # BLD AUTO: 4.42 M/UL (ref 4–5.4)
RHEUMATOID FACT SERPL-ACNC: <13 IU/ML (ref 0–15)
SODIUM SERPL-SCNC: 138 MMOL/L (ref 136–145)
TRIGL SERPL-MCNC: 42 MG/DL (ref 30–150)
TSH SERPL DL<=0.005 MIU/L-ACNC: 0.8 UIU/ML (ref 0.4–4)
URATE SERPL-MCNC: 4 MG/DL (ref 2.4–5.7)
WBC # BLD AUTO: 6.34 K/UL (ref 3.9–12.7)

## 2022-04-22 PROCEDURE — 80061 LIPID PANEL: CPT | Performed by: NURSE PRACTITIONER

## 2022-04-22 PROCEDURE — 86431 RHEUMATOID FACTOR QUANT: CPT | Performed by: NURSE PRACTITIONER

## 2022-04-22 PROCEDURE — 80053 COMPREHEN METABOLIC PANEL: CPT | Performed by: NURSE PRACTITIONER

## 2022-04-22 PROCEDURE — 85651 RBC SED RATE NONAUTOMATED: CPT | Performed by: NURSE PRACTITIONER

## 2022-04-22 PROCEDURE — 84550 ASSAY OF BLOOD/URIC ACID: CPT | Performed by: NURSE PRACTITIONER

## 2022-04-22 PROCEDURE — 85025 COMPLETE CBC W/AUTO DIFF WBC: CPT | Performed by: NURSE PRACTITIONER

## 2022-04-22 PROCEDURE — 36415 PR COLLECTION VENOUS BLOOD,VENIPUNCTURE: ICD-10-PCS | Mod: PN,,, | Performed by: NURSE PRACTITIONER

## 2022-04-22 PROCEDURE — 84443 ASSAY THYROID STIM HORMONE: CPT | Performed by: NURSE PRACTITIONER

## 2022-04-22 PROCEDURE — 36415 COLL VENOUS BLD VENIPUNCTURE: CPT | Mod: PN,,, | Performed by: NURSE PRACTITIONER

## 2022-05-06 ENCOUNTER — OFFICE VISIT (OUTPATIENT)
Dept: FAMILY MEDICINE | Facility: CLINIC | Age: 37
End: 2022-05-06
Payer: COMMERCIAL

## 2022-05-06 VITALS
RESPIRATION RATE: 16 BRPM | HEIGHT: 61 IN | SYSTOLIC BLOOD PRESSURE: 122 MMHG | TEMPERATURE: 98 F | HEART RATE: 94 BPM | OXYGEN SATURATION: 96 % | DIASTOLIC BLOOD PRESSURE: 76 MMHG | WEIGHT: 132.06 LBS | BODY MASS INDEX: 24.93 KG/M2

## 2022-05-06 DIAGNOSIS — M25.50 GENERALIZED JOINT PAIN: Primary | ICD-10-CM

## 2022-05-06 PROCEDURE — 3008F PR BODY MASS INDEX (BMI) DOCUMENTED: ICD-10-PCS | Mod: CPTII,S$GLB,, | Performed by: NURSE PRACTITIONER

## 2022-05-06 PROCEDURE — 3008F BODY MASS INDEX DOCD: CPT | Mod: CPTII,S$GLB,, | Performed by: NURSE PRACTITIONER

## 2022-05-06 PROCEDURE — 1159F PR MEDICATION LIST DOCUMENTED IN MEDICAL RECORD: ICD-10-PCS | Mod: CPTII,S$GLB,, | Performed by: NURSE PRACTITIONER

## 2022-05-06 PROCEDURE — 99999 PR PBB SHADOW E&M-EST. PATIENT-LVL IV: ICD-10-PCS | Mod: PBBFAC,,, | Performed by: NURSE PRACTITIONER

## 2022-05-06 PROCEDURE — 99999 PR PBB SHADOW E&M-EST. PATIENT-LVL IV: CPT | Mod: PBBFAC,,, | Performed by: NURSE PRACTITIONER

## 2022-05-06 PROCEDURE — 3078F DIAST BP <80 MM HG: CPT | Mod: CPTII,S$GLB,, | Performed by: NURSE PRACTITIONER

## 2022-05-06 PROCEDURE — 1160F PR REVIEW ALL MEDS BY PRESCRIBER/CLIN PHARMACIST DOCUMENTED: ICD-10-PCS | Mod: CPTII,S$GLB,, | Performed by: NURSE PRACTITIONER

## 2022-05-06 PROCEDURE — 3074F SYST BP LT 130 MM HG: CPT | Mod: CPTII,S$GLB,, | Performed by: NURSE PRACTITIONER

## 2022-05-06 PROCEDURE — 1159F MED LIST DOCD IN RCRD: CPT | Mod: CPTII,S$GLB,, | Performed by: NURSE PRACTITIONER

## 2022-05-06 PROCEDURE — 99214 PR OFFICE/OUTPT VISIT, EST, LEVL IV, 30-39 MIN: ICD-10-PCS | Mod: S$GLB,,, | Performed by: NURSE PRACTITIONER

## 2022-05-06 PROCEDURE — 1160F RVW MEDS BY RX/DR IN RCRD: CPT | Mod: CPTII,S$GLB,, | Performed by: NURSE PRACTITIONER

## 2022-05-06 PROCEDURE — 3078F PR MOST RECENT DIASTOLIC BLOOD PRESSURE < 80 MM HG: ICD-10-PCS | Mod: CPTII,S$GLB,, | Performed by: NURSE PRACTITIONER

## 2022-05-06 PROCEDURE — 3074F PR MOST RECENT SYSTOLIC BLOOD PRESSURE < 130 MM HG: ICD-10-PCS | Mod: CPTII,S$GLB,, | Performed by: NURSE PRACTITIONER

## 2022-05-06 PROCEDURE — 99214 OFFICE O/P EST MOD 30 MIN: CPT | Mod: S$GLB,,, | Performed by: NURSE PRACTITIONER

## 2022-05-06 NOTE — PROGRESS NOTES
Subjective:       Patient ID: Bernarda Chandler is a 36 y.o. female.    Chief Complaint: Follow-up (2 week f/u)    Damaris Chandler presents to the clinic today for lab review/ follow up.   She has only been seen by her OBGYN Dr. Nunez in [place]     She was prompted to be seen 4/21/2022 secondary to increasing generalized joint pains that had been increasing over the past few months. She denies any injury or trauma. Denies any changes to lifestyle or activity. Reports she stays relatively active as she has 5 children.   She reported that initially she noticed pain to her right elbow- thought she had overused it and thought nothing of it, progressively the dull aching has moved to her fingers bilaterally, bilateral wrists, ankles, and knees. She reports that it may not always be every joint at one time. Sometimes she will wake up in the morning hurting any other times she will be fine when she wakes up and the more she moves/does the pain will gradually develop. She denies any warmth or swelling to any of these joints. She denies any response to heat/ice. She is only using otc Tylenol/Ibuprofen as needed.     Reports that over the past 2 weeks her symptoms that initially brought her to the clinic have resolved and she has only had slight discomfort to her wrist and ankles since then.    Review of Systems   Constitutional: Negative.    HENT: Negative.    Eyes: Negative.    Respiratory: Negative.    Cardiovascular: Negative.    Gastrointestinal: Negative.    Endocrine: Negative.    Genitourinary: Negative.    Musculoskeletal: Positive for arthralgias.   Skin: Negative.    Allergic/Immunologic: Negative.    Neurological: Negative.    Hematological: Negative.    Psychiatric/Behavioral: Negative.        Patient Active Problem List   Diagnosis   (none) - all problems resolved or deleted       Objective:      Physical Exam  Constitutional:       General: She is not in acute distress.     Appearance: Normal appearance.  "  Cardiovascular:      Rate and Rhythm: Normal rate and regular rhythm.      Heart sounds: Normal heart sounds.   Pulmonary:      Effort: Pulmonary effort is normal. No respiratory distress.   Musculoskeletal:      Right lower leg: No edema.   Skin:     Findings: No rash.   Neurological:      General: No focal deficit present.      Mental Status: She is alert.   Psychiatric:         Mood and Affect: Mood normal.         Behavior: Behavior normal.         Lab Results   Component Value Date    WBC 6.34 04/22/2022    HGB 13.2 04/22/2022    HCT 39.8 04/22/2022     04/22/2022    CHOL 144 04/22/2022    TRIG 42 04/22/2022    HDL 61 04/22/2022    ALT 19 04/22/2022    AST 18 04/22/2022     04/22/2022    K 4.2 04/22/2022     04/22/2022    CREATININE 0.8 04/22/2022    BUN 11 04/22/2022    CO2 24 04/22/2022    TSH 0.804 04/22/2022     The ASCVD Risk score (Ace DC Jr., et al., 2013) failed to calculate for the following reasons:    The 2013 ASCVD risk score is only valid for ages 40 to 79  Visit Vitals  /76 (BP Location: Left arm, Patient Position: Sitting, BP Method: Medium (Manual))   Pulse 94   Temp 98.4 °F (36.9 °C) (Oral)   Resp 16   Ht 5' 1" (1.549 m)   Wt 59.9 kg (132 lb 0.9 oz)   SpO2 96%   BMI 24.95 kg/m²      Assessment:       1. Generalized joint pain        Plan:       Bernarda was seen today for follow-up.    Diagnoses and all orders for this visit:    Generalized joint pain  Anti-inflammatory diet  Maintain physical activity  Topicals as tolerated  rtc If s/sx return/worsen for repeat inflammatory markers.     Follow up in about 1 year (around 5/6/2023).      Future Appointments     Date Provider Specialty Appt Notes    5/8/2023 Cate Jara NP Family Medicine annual           "

## 2022-05-31 ENCOUNTER — PATIENT MESSAGE (OUTPATIENT)
Dept: ADMINISTRATIVE | Facility: HOSPITAL | Age: 37
End: 2022-05-31
Payer: COMMERCIAL

## 2022-08-24 ENCOUNTER — PATIENT MESSAGE (OUTPATIENT)
Dept: ADMINISTRATIVE | Facility: HOSPITAL | Age: 37
End: 2022-08-24
Payer: COMMERCIAL

## 2022-08-31 ENCOUNTER — PATIENT OUTREACH (OUTPATIENT)
Dept: ADMINISTRATIVE | Facility: HOSPITAL | Age: 37
End: 2022-08-31
Payer: COMMERCIAL

## 2022-08-31 NOTE — LETTER
August 31, 2022    Bernarda Chandler  [address] [place] [address]             [facility]  [address]  [facility] [address]  Phone: [phone] Dear Ms. Geraldine,    Good morning!!    I am reaching out to you because you are due for a Pap smear. Have you had a recent pap smear, if so when and where. If you have not had a pap smear, can I schedule one for you with an [facility] and let you know when it is scheduled for?    Thanks so much and have a great day!!    Shiloh Randall LPN [facility]  [address] [place] [address]  P- [phone]  F- [phone]          Shiloh Randall LPN

## 2022-10-11 ENCOUNTER — PATIENT MESSAGE (OUTPATIENT)
Dept: ADMINISTRATIVE | Facility: HOSPITAL | Age: 37
End: 2022-10-11
Payer: COMMERCIAL

## 2022-11-08 ENCOUNTER — OFFICE VISIT (OUTPATIENT)
Dept: FAMILY MEDICINE | Facility: CLINIC | Age: 37
End: 2022-11-08
Payer: COMMERCIAL

## 2022-11-08 VITALS
SYSTOLIC BLOOD PRESSURE: 108 MMHG | TEMPERATURE: 98 F | OXYGEN SATURATION: 97 % | BODY MASS INDEX: 25.34 KG/M2 | HEIGHT: 61 IN | WEIGHT: 134.25 LBS | HEART RATE: 94 BPM | DIASTOLIC BLOOD PRESSURE: 70 MMHG

## 2022-11-08 DIAGNOSIS — F41.1 GENERALIZED ANXIETY DISORDER WITH PANIC ATTACKS: Primary | ICD-10-CM

## 2022-11-08 DIAGNOSIS — F41.0 GENERALIZED ANXIETY DISORDER WITH PANIC ATTACKS: Primary | ICD-10-CM

## 2022-11-08 PROCEDURE — 3074F PR MOST RECENT SYSTOLIC BLOOD PRESSURE < 130 MM HG: ICD-10-PCS | Mod: CPTII,S$GLB,, | Performed by: NURSE PRACTITIONER

## 2022-11-08 PROCEDURE — 3078F DIAST BP <80 MM HG: CPT | Mod: CPTII,S$GLB,, | Performed by: NURSE PRACTITIONER

## 2022-11-08 PROCEDURE — 99999 PR PBB SHADOW E&M-EST. PATIENT-LVL III: CPT | Mod: PBBFAC,,, | Performed by: NURSE PRACTITIONER

## 2022-11-08 PROCEDURE — 1159F MED LIST DOCD IN RCRD: CPT | Mod: CPTII,S$GLB,, | Performed by: NURSE PRACTITIONER

## 2022-11-08 PROCEDURE — 3078F PR MOST RECENT DIASTOLIC BLOOD PRESSURE < 80 MM HG: ICD-10-PCS | Mod: CPTII,S$GLB,, | Performed by: NURSE PRACTITIONER

## 2022-11-08 PROCEDURE — 3074F SYST BP LT 130 MM HG: CPT | Mod: CPTII,S$GLB,, | Performed by: NURSE PRACTITIONER

## 2022-11-08 PROCEDURE — 1159F PR MEDICATION LIST DOCUMENTED IN MEDICAL RECORD: ICD-10-PCS | Mod: CPTII,S$GLB,, | Performed by: NURSE PRACTITIONER

## 2022-11-08 PROCEDURE — 1160F RVW MEDS BY RX/DR IN RCRD: CPT | Mod: CPTII,S$GLB,, | Performed by: NURSE PRACTITIONER

## 2022-11-08 PROCEDURE — 3008F BODY MASS INDEX DOCD: CPT | Mod: CPTII,S$GLB,, | Performed by: NURSE PRACTITIONER

## 2022-11-08 PROCEDURE — 1160F PR REVIEW ALL MEDS BY PRESCRIBER/CLIN PHARMACIST DOCUMENTED: ICD-10-PCS | Mod: CPTII,S$GLB,, | Performed by: NURSE PRACTITIONER

## 2022-11-08 PROCEDURE — 3008F PR BODY MASS INDEX (BMI) DOCUMENTED: ICD-10-PCS | Mod: CPTII,S$GLB,, | Performed by: NURSE PRACTITIONER

## 2022-11-08 PROCEDURE — 99214 OFFICE O/P EST MOD 30 MIN: CPT | Mod: S$GLB,,, | Performed by: NURSE PRACTITIONER

## 2022-11-08 PROCEDURE — 99999 PR PBB SHADOW E&M-EST. PATIENT-LVL III: ICD-10-PCS | Mod: PBBFAC,,, | Performed by: NURSE PRACTITIONER

## 2022-11-08 PROCEDURE — 99214 PR OFFICE/OUTPT VISIT, EST, LEVL IV, 30-39 MIN: ICD-10-PCS | Mod: S$GLB,,, | Performed by: NURSE PRACTITIONER

## 2022-11-08 RX ORDER — SERTRALINE HYDROCHLORIDE 25 MG/1
25 TABLET, FILM COATED ORAL DAILY
Qty: 30 TABLET | Refills: 11 | Status: SHIPPED | OUTPATIENT
Start: 2022-11-08 | End: 2023-11-08 | Stop reason: SDUPTHER

## 2022-11-08 RX ORDER — BUSPIRONE HYDROCHLORIDE 5 MG/1
5 TABLET ORAL 3 TIMES DAILY
Qty: 90 TABLET | Refills: 11 | Status: SHIPPED | OUTPATIENT
Start: 2022-11-08 | End: 2023-11-08 | Stop reason: SDUPTHER

## 2022-11-08 NOTE — PROGRESS NOTES
Subjective:       Patient ID: Bernarad Chandler is a 36 y.o. female.    Chief Complaint: Anxiety and Follow-up    Bernarda Chandler presents to the clinic today to discuss her anxiety accompanied with panic attacks  Has a history of anxiety with panic attacks. Was on Zoloft in the past for this and she reports she did well. Last time she received medications was in 2019- was seeing Cyndie Bland following the birth of her daughter. Was given Zoloft 25 mg and Alprazolam 0.25 mg as needed. She still has some Alprazolam that she reports she has recently utilized.   Reports first panic attack was at the age of 19 and this surrounded driving/being in a car.  Reports that her panic attacks are triggered initially with driving or driving long distances- this does not depend on if she is driving or not, reports recently that they have worsened to anytime that she is leaving her home. Denies any past traumatic event, car accident etc that could be underlying to this.   Has tried Wellbutrin and Effexor in the past and did not tolerate these.     Review of Systems   Respiratory:  Positive for chest tightness and shortness of breath.    Cardiovascular:  Positive for palpitations.   Psychiatric/Behavioral:  The patient is nervous/anxious.      Patient Active Problem List   Diagnosis   (none) - all problems resolved or deleted       Objective:      Physical Exam  Constitutional:       Appearance: Normal appearance.      Comments: Emotionally upset, but in no acute distress    Eyes:      Conjunctiva/sclera: Conjunctivae normal.   Cardiovascular:      Rate and Rhythm: Normal rate and regular rhythm.      Heart sounds: Normal heart sounds. No murmur heard.  Pulmonary:      Effort: Pulmonary effort is normal.      Breath sounds: Normal breath sounds.   Abdominal:      General: Bowel sounds are normal.   Skin:     Findings: No rash.   Neurological:      General: No focal deficit present.      Mental Status: She is alert and oriented to  "person, place, and time.   Psychiatric:         Attention and Perception: Attention normal.         Mood and Affect: Mood is anxious.         Speech: Speech normal.         Behavior: Behavior normal.         Thought Content: Thought content normal.       Lab Results   Component Value Date    WBC 6.34 04/22/2022    HGB 13.2 04/22/2022    HCT 39.8 04/22/2022     04/22/2022    CHOL 144 04/22/2022    TRIG 42 04/22/2022    HDL 61 04/22/2022    ALT 19 04/22/2022    AST 18 04/22/2022     04/22/2022    K 4.2 04/22/2022     04/22/2022    CREATININE 0.8 04/22/2022    BUN 11 04/22/2022    CO2 24 04/22/2022    TSH 0.804 04/22/2022     The ASCVD Risk score (Pattie MELCHOR, et al., 2019) failed to calculate for the following reasons:    The 2019 ASCVD risk score is only valid for ages 40 to 79  Visit Vitals  /70 (BP Location: Right arm, Patient Position: Sitting, BP Method: Medium (Manual))   Pulse 94   Temp 98.1 °F (36.7 °C)   Ht 5' 1" (1.549 m)   Wt 60.9 kg (134 lb 4.2 oz)   SpO2 97%   BMI 25.37 kg/m²      Assessment:       1. Generalized anxiety disorder with panic attacks        Plan:       1. Generalized anxiety disorder with panic attacks  -     sertraline (ZOLOFT) 25 MG tablet; Take 1 tablet (25 mg total) by mouth once daily.  Dispense: 30 tablet; Refill: 11  -     busPIRone (BUSPAR) 5 MG Tab; Take 1 tablet (5 mg total) by mouth 3 (three) times daily.  Dispense: 90 tablet; Refill: 11   Zoloft daily- allow 2-12 weeks to reach theraputic level  Buspar BID, use 3rd dose as needed. Take 1 hour prior to travel etc.   Discussed possible adverse reactions/side effects- all questions/concerns answers at this time    Follow up in about 6 weeks (around 12/20/2022).      Future Appointments       Date Provider Specialty Appt Notes    5/8/2023 Cate Jara NP Family Medicine annual               "

## 2023-01-12 ENCOUNTER — PATIENT MESSAGE (OUTPATIENT)
Dept: ADMINISTRATIVE | Facility: HOSPITAL | Age: 38
End: 2023-01-12
Payer: COMMERCIAL

## 2023-01-17 ENCOUNTER — PATIENT MESSAGE (OUTPATIENT)
Dept: ADMINISTRATIVE | Facility: HOSPITAL | Age: 38
End: 2023-01-17
Payer: COMMERCIAL

## 2023-02-24 ENCOUNTER — PATIENT OUTREACH (OUTPATIENT)
Dept: ADMINISTRATIVE | Facility: HOSPITAL | Age: 38
End: 2023-02-24
Payer: COMMERCIAL

## 2023-02-24 NOTE — LETTER
AUTHORIZATION FOR RELEASE OF   CONFIDENTIAL INFORMATION    DR CRENSHAW    We are seeing Bernarda Chandler, date of birth 1985, in the clinic at [facility] MEDICINE 1ST FLOOR. Cate Jara NP is the patient's PCP. Bernarda Chandler has an outstanding lab/procedure at the time we reviewed her chart. In order to help keep her health information updated, she has authorized us to request the following medical record(s):        (  )  MAMMOGRAM                                      (  )  COLONOSCOPY      (  X)  PAP SMEAR                                          (  )  OUTSIDE LAB RESULTS     (  )  DEXA SCAN                                          (  )  EYE EXAM            (  )  FOOT EXAM                                          (  )  ENTIRE RECORD     (  )  OUTSIDE IMMUNIZATIONS                 (  )  _______________         Please fax records to [facility], Jessica Necaise, NP, [phone]    Thank you in advance,       Mikayla ANDERSON  Care Coordinator  [facility]  [address] [place] [address]  Phone [phone]  Fax [phone]           Patient Name: Bernarda Chandler  : 1985  Patient Phone #: [phone]

## 2023-03-01 ENCOUNTER — PATIENT OUTREACH (OUTPATIENT)
Dept: ADMINISTRATIVE | Facility: HOSPITAL | Age: 38
End: 2023-03-01
Payer: COMMERCIAL

## 2023-04-12 ENCOUNTER — PATIENT MESSAGE (OUTPATIENT)
Dept: ADMINISTRATIVE | Facility: HOSPITAL | Age: 38
End: 2023-04-12
Payer: COMMERCIAL

## 2023-04-27 ENCOUNTER — PATIENT OUTREACH (OUTPATIENT)
Dept: ADMINISTRATIVE | Facility: HOSPITAL | Age: 38
End: 2023-04-27

## 2023-04-27 NOTE — PROGRESS NOTES
[facility] REPORT  LM REMINDING PATIENT TO SCHEDULE PAP WITH DR TIERNEY

## 2023-05-08 ENCOUNTER — PATIENT OUTREACH (OUTPATIENT)
Dept: ADMINISTRATIVE | Facility: HOSPITAL | Age: 38
End: 2023-05-08

## 2023-11-08 DIAGNOSIS — F41.0 GENERALIZED ANXIETY DISORDER WITH PANIC ATTACKS: ICD-10-CM

## 2023-11-08 DIAGNOSIS — F41.1 GENERALIZED ANXIETY DISORDER WITH PANIC ATTACKS: ICD-10-CM

## 2023-11-08 RX ORDER — SERTRALINE HYDROCHLORIDE 25 MG/1
25 TABLET, FILM COATED ORAL DAILY
Qty: 30 TABLET | Refills: 0 | Status: SHIPPED | OUTPATIENT
Start: 2023-11-08 | End: 2023-12-07

## 2023-11-08 RX ORDER — BUSPIRONE HYDROCHLORIDE 5 MG/1
5 TABLET ORAL 3 TIMES DAILY
Qty: 90 TABLET | Refills: 0 | Status: SHIPPED | OUTPATIENT
Start: 2023-11-08 | End: 2023-12-27 | Stop reason: DRUGHIGH

## 2023-12-06 DIAGNOSIS — F41.1 GENERALIZED ANXIETY DISORDER WITH PANIC ATTACKS: ICD-10-CM

## 2023-12-06 DIAGNOSIS — F41.0 GENERALIZED ANXIETY DISORDER WITH PANIC ATTACKS: ICD-10-CM

## 2023-12-06 NOTE — TELEPHONE ENCOUNTER
Refill Routing Note   Medication(s) are not appropriate for processing by [facility] for the following reason(s):        Non-participating provider    [facility] action(s):  Route               Appointments  past 12m or future 3m with PCP    Date Provider   Last Visit   11/8/2022 Cate Jara NP   Next Visit   12/27/2023 Cate Jara NP   ED visits in past 90 days: 0        Note composed:1:45 PM 12/06/2023

## 2023-12-07 RX ORDER — SERTRALINE HYDROCHLORIDE 25 MG/1
25 TABLET, FILM COATED ORAL
Qty: 30 TABLET | Refills: 0 | Status: SHIPPED | OUTPATIENT
Start: 2023-12-07 | End: 2023-12-27 | Stop reason: SDUPTHER

## 2023-12-07 NOTE — TELEPHONE ENCOUNTER
I have provided a 30 day short term supply  Patient needs an appointment for additional refills- Patient has not been seen in office > 1 year

## 2023-12-07 NOTE — TELEPHONE ENCOUNTER
Called patient to discuss scheduling - no answer; left VM. Patient has appointment scheduled for 12/27/23. Sent Mobile [employer]ie message regarding need to keep that appointment.

## 2023-12-20 ENCOUNTER — PATIENT MESSAGE (OUTPATIENT)
Dept: FAMILY MEDICINE | Facility: CLINIC | Age: 38
End: 2023-12-20

## 2023-12-27 ENCOUNTER — OFFICE VISIT (OUTPATIENT)
Dept: FAMILY MEDICINE | Facility: CLINIC | Age: 38
End: 2023-12-27

## 2023-12-27 VITALS
OXYGEN SATURATION: 96 % | HEART RATE: 85 BPM | WEIGHT: 143 LBS | DIASTOLIC BLOOD PRESSURE: 66 MMHG | SYSTOLIC BLOOD PRESSURE: 104 MMHG | BODY MASS INDEX: 27.02 KG/M2

## 2023-12-27 DIAGNOSIS — Z00.00 WELLNESS EXAMINATION: Primary | ICD-10-CM

## 2023-12-27 DIAGNOSIS — F41.0 GENERALIZED ANXIETY DISORDER WITH PANIC ATTACKS: ICD-10-CM

## 2023-12-27 DIAGNOSIS — F41.1 GENERALIZED ANXIETY DISORDER WITH PANIC ATTACKS: ICD-10-CM

## 2023-12-27 PROCEDURE — 99999 PR PBB SHADOW E&M-EST. PATIENT-LVL III: CPT | Mod: PBBFAC,,, | Performed by: NURSE PRACTITIONER

## 2023-12-27 PROCEDURE — 99213 OFFICE O/P EST LOW 20 MIN: CPT | Mod: S$PBB,,, | Performed by: NURSE PRACTITIONER

## 2023-12-27 PROCEDURE — 99213 OFFICE O/P EST LOW 20 MIN: CPT | Mod: PBBFAC,PN | Performed by: NURSE PRACTITIONER

## 2023-12-27 PROCEDURE — 99213 PR OFFICE/OUTPT VISIT, EST, LEVL III, 20-29 MIN: ICD-10-PCS | Mod: S$PBB,,, | Performed by: NURSE PRACTITIONER

## 2023-12-27 PROCEDURE — 99999 PR PBB SHADOW E&M-EST. PATIENT-LVL III: ICD-10-PCS | Mod: PBBFAC,,, | Performed by: NURSE PRACTITIONER

## 2023-12-27 RX ORDER — BUSPIRONE HYDROCHLORIDE 7.5 MG/1
7.5 TABLET ORAL 2 TIMES DAILY
Qty: 180 TABLET | Refills: 3 | Status: SHIPPED | OUTPATIENT
Start: 2023-12-27 | End: 2024-12-26

## 2023-12-27 RX ORDER — BUSPIRONE HYDROCHLORIDE 5 MG/1
5 TABLET ORAL 3 TIMES DAILY
Qty: 90 TABLET | Status: CANCELLED | OUTPATIENT
Start: 2023-12-27 | End: 2024-12-26

## 2023-12-27 RX ORDER — SERTRALINE HYDROCHLORIDE 25 MG/1
25 TABLET, FILM COATED ORAL DAILY
Qty: 90 TABLET | Refills: 3 | Status: SHIPPED | OUTPATIENT
Start: 2023-12-27

## 2023-12-27 NOTE — PROGRESS NOTES
Subjective:       Patient ID: Bernarda Chandler is a 38 y.o. female.    Chief Complaint: Annual Exam and Medication Refill    Bernarda Chandler presents to the clinic today for annual for medication refills and blood work orders.  Hx of anxiety accompanied with panic attacks  Has been taking Zoloft daily and using Buspar 1.5 tablets (7.5 mg) twice daily and reports that this is controlling her anxiety           Review of Systems   Constitutional:  Negative for activity change, appetite change and fatigue.   Eyes: Negative.    Respiratory:  Negative for chest tightness, shortness of breath and wheezing.    Cardiovascular:  Negative for chest pain and palpitations.   Gastrointestinal:  Negative for abdominal pain, blood in stool, constipation and diarrhea.   Genitourinary:  Negative for difficulty urinating, pelvic pain and vaginal pain.   Musculoskeletal: Negative.    Neurological: Negative.    Hematological: Negative.    Psychiatric/Behavioral:  Negative for agitation, dysphoric mood and sleep disturbance. The patient is nervous/anxious.        Patient Active Problem List   Diagnosis   (none) - all problems resolved or deleted       Objective:      Physical Exam  Constitutional:       General: She is not in acute distress.     Appearance: Normal appearance.   Cardiovascular:      Rate and Rhythm: Normal rate and regular rhythm.      Heart sounds: Normal heart sounds. No murmur heard.  Pulmonary:      Effort: Pulmonary effort is normal. No respiratory distress.      Breath sounds: Normal breath sounds. No wheezing.   Abdominal:      General: Bowel sounds are normal. There is no distension.   Lymphadenopathy:      Cervical: No cervical adenopathy.   Skin:     General: Skin is warm and dry.      Findings: No rash.   Neurological:      Mental Status: She is alert and oriented to person, place, and time.   Psychiatric:         Mood and Affect: Mood normal.         Behavior: Behavior normal.         Lab Results   Component  Value Date    WBC 6.34 04/22/2022    HGB 13.2 04/22/2022    HCT 39.8 04/22/2022     04/22/2022    CHOL 144 04/22/2022    TRIG 42 04/22/2022    HDL 61 04/22/2022    ALT 19 04/22/2022    AST 18 04/22/2022     04/22/2022    K 4.2 04/22/2022     04/22/2022    CREATININE 0.8 04/22/2022    BUN 11 04/22/2022    CO2 24 04/22/2022    TSH 0.804 04/22/2022     The ASCVD Risk score (Pattie MELCHOR, et al., 2019) failed to calculate for the following reasons:    The 2019 ASCVD risk score is only valid for ages 40 to 79  Visit Vitals  /66 (BP Location: Right arm, Patient Position: Sitting, BP Method: Medium (Manual))   Pulse 85   Wt 64.9 kg (143 lb)   LMP 12/15/2023 (Approximate)   SpO2 96%   BMI 27.02 kg/m²      Assessment:       1. Wellness examination    2. Generalized anxiety disorder with panic attacks        Plan:       1. Wellness examination  -     Comprehensive Metabolic Panel; Future; Expected date: 12/27/2023  -     CBC Auto Differential; Future; Expected date: 12/27/2023  Obtain fasting blood work for review- patient provided with paper copies as will obtain at [facility]   2. Generalized anxiety disorder with panic attacks  -     sertraline (ZOLOFT) 25 MG tablet; Take 1 tablet (25 mg total) by mouth once daily.  Dispense: 90 tablet; Refill: 3  -     busPIRone (BUSPAR) 7.5 MG tablet; Take 1 tablet (7.5 mg total) by mouth 2 (two) times a day.  Dispense: 180 tablet; Refill: 3  Continue current medication regimen  Buspar changed from 5 mg to 7.5 mg to reflect how patient has been taking as she reports controlled s/sx        Follow up in about 1 year (around 12/27/2024).      Future Appointments       Date Provider Specialty Appt Notes    12/27/2024 Cate Jara NP Family Medicine annual & medication refill

## 2024-03-19 ENCOUNTER — PATIENT MESSAGE (OUTPATIENT)
Dept: ADMINISTRATIVE | Facility: HOSPITAL | Age: 39
End: 2024-03-19

## 2024-12-26 ENCOUNTER — OFFICE VISIT (OUTPATIENT)
Dept: FAMILY MEDICINE | Facility: CLINIC | Age: 39
End: 2024-12-26
Payer: MEDICAID

## 2024-12-26 VITALS
HEIGHT: 61 IN | HEART RATE: 74 BPM | WEIGHT: 152.19 LBS | SYSTOLIC BLOOD PRESSURE: 118 MMHG | BODY MASS INDEX: 28.73 KG/M2 | RESPIRATION RATE: 18 BRPM | OXYGEN SATURATION: 100 % | DIASTOLIC BLOOD PRESSURE: 78 MMHG

## 2024-12-26 DIAGNOSIS — Z00.00 WELLNESS EXAMINATION: Primary | ICD-10-CM

## 2024-12-26 DIAGNOSIS — F41.0 GENERALIZED ANXIETY DISORDER WITH PANIC ATTACKS: ICD-10-CM

## 2024-12-26 DIAGNOSIS — F41.1 GENERALIZED ANXIETY DISORDER WITH PANIC ATTACKS: ICD-10-CM

## 2024-12-26 DIAGNOSIS — Z13.1 ENCOUNTER FOR SCREENING EXAMINATION FOR IMPAIRED GLUCOSE REGULATION AND DIABETES MELLITUS: ICD-10-CM

## 2024-12-26 DIAGNOSIS — Z13.6 ENCOUNTER FOR LIPID SCREENING FOR CARDIOVASCULAR DISEASE: ICD-10-CM

## 2024-12-26 DIAGNOSIS — Z13.220 ENCOUNTER FOR LIPID SCREENING FOR CARDIOVASCULAR DISEASE: ICD-10-CM

## 2024-12-26 DIAGNOSIS — R53.83 FATIGUE, UNSPECIFIED TYPE: ICD-10-CM

## 2024-12-26 PROCEDURE — 99999 PR PBB SHADOW E&M-EST. PATIENT-LVL III: CPT | Mod: PBBFAC,,, | Performed by: NURSE PRACTITIONER

## 2024-12-26 PROCEDURE — 1160F RVW MEDS BY RX/DR IN RCRD: CPT | Mod: CPTII,,, | Performed by: NURSE PRACTITIONER

## 2024-12-26 PROCEDURE — 1159F MED LIST DOCD IN RCRD: CPT | Mod: CPTII,,, | Performed by: NURSE PRACTITIONER

## 2024-12-26 PROCEDURE — 99395 PREV VISIT EST AGE 18-39: CPT | Mod: S$PBB,,, | Performed by: NURSE PRACTITIONER

## 2024-12-26 PROCEDURE — 3078F DIAST BP <80 MM HG: CPT | Mod: CPTII,,, | Performed by: NURSE PRACTITIONER

## 2024-12-26 PROCEDURE — 99213 OFFICE O/P EST LOW 20 MIN: CPT | Mod: PBBFAC,PN | Performed by: NURSE PRACTITIONER

## 2024-12-26 PROCEDURE — 3008F BODY MASS INDEX DOCD: CPT | Mod: CPTII,,, | Performed by: NURSE PRACTITIONER

## 2024-12-26 PROCEDURE — 3074F SYST BP LT 130 MM HG: CPT | Mod: CPTII,,, | Performed by: NURSE PRACTITIONER

## 2024-12-26 RX ORDER — BUSPIRONE HYDROCHLORIDE 7.5 MG/1
7.5 TABLET ORAL 2 TIMES DAILY
Qty: 180 TABLET | Refills: 3 | Status: SHIPPED | OUTPATIENT
Start: 2024-12-26 | End: 2025-12-26

## 2024-12-26 RX ORDER — SERTRALINE HYDROCHLORIDE 25 MG/1
25 TABLET, FILM COATED ORAL DAILY
Qty: 90 TABLET | Refills: 3 | Status: CANCELLED | OUTPATIENT
Start: 2024-12-26

## 2024-12-26 RX ORDER — SERTRALINE HYDROCHLORIDE 25 MG/1
25 TABLET, FILM COATED ORAL DAILY
Qty: 90 TABLET | Refills: 3 | Status: SHIPPED | OUTPATIENT
Start: 2024-12-26

## 2024-12-26 RX ORDER — BUSPIRONE HYDROCHLORIDE 7.5 MG/1
7.5 TABLET ORAL 2 TIMES DAILY
Qty: 180 TABLET | Refills: 3 | Status: CANCELLED | OUTPATIENT
Start: 2024-12-26 | End: 2025-12-26

## 2024-12-26 NOTE — PROGRESS NOTES
"Subjective:       Patient ID: Bernarda Chandler is a 39 y.o. female.    Chief Complaint: Annual Exam    Bernarda Chandler presents to the clinic today for her wellness    Patient Active Problem List  Diagnosis  · Generalized anxiety disorder with panic attacks    Medication refills:  Doing well with current regimen. Needs refills       Review of Systems    Patient Active Problem List   Diagnosis    Generalized anxiety disorder with panic attacks       Objective:      Physical Exam  Constitutional:       General: She is not in acute distress.     Appearance: Normal appearance.   Eyes:      Conjunctiva/sclera: Conjunctivae normal.   Cardiovascular:      Rate and Rhythm: Normal rate and regular rhythm.      Heart sounds: Normal heart sounds. No murmur heard.  Pulmonary:      Effort: Pulmonary effort is normal. No respiratory distress.      Breath sounds: Normal breath sounds. No wheezing.   Abdominal:      General: Bowel sounds are normal. There is no distension.   Lymphadenopathy:      Cervical: No cervical adenopathy.   Skin:     General: Skin is warm and dry.      Findings: No rash.   Neurological:      Mental Status: She is alert and oriented to person, place, and time.   Psychiatric:         Mood and Affect: Mood normal.         Behavior: Behavior normal.         Lab Results   Component Value Date    WBC 6.34 04/22/2022    HGB 13.2 04/22/2022    HCT 39.8 04/22/2022     04/22/2022    CHOL 144 04/22/2022    TRIG 42 04/22/2022    HDL 61 04/22/2022    ALT 19 04/22/2022    AST 18 04/22/2022     04/22/2022    K 4.2 04/22/2022     04/22/2022    CREATININE 0.8 04/22/2022    BUN 11 04/22/2022    CO2 24 04/22/2022    TSH 0.804 04/22/2022     The ASCVD Risk score (Pattie MELCHOR, et al., 2019) failed to calculate for the following reasons:    The 2019 ASCVD risk score is only valid for ages 40 to 79  Visit Vitals  /78 (BP Location: Right arm, Patient Position: Sitting)   Pulse 74   Resp 18   Ht 5' 1" (1.549 " m)   Wt 69 kg (152 lb 3.2 oz)   SpO2 100%   Breastfeeding No   BMI 28.76 kg/m²      Assessment:       1. Wellness examination    2. Encounter for lipid screening for cardiovascular disease    3. Encounter for screening examination for impaired glucose regulation and diabetes mellitus    4. Generalized anxiety disorder with panic attacks    5. Fatigue, unspecified type        Plan:       1. Wellness examination  -     Comprehensive Metabolic Panel; Future; Expected date: 12/26/2024  Obtain fasting blood work for review- paper copies provided- patient will complete at [facility]   2. Encounter for lipid screening for cardiovascular disease  -     Lipid Panel; Future; Expected date: 12/26/2024    3. Encounter for screening examination for impaired glucose regulation and diabetes mellitus  -     Hemoglobin A1C; Future; Expected date: 12/26/2024    4. Generalized anxiety disorder with panic attacks  -     sertraline (ZOLOFT) 25 MG tablet; Take 1 tablet (25 mg total) by mouth once daily.  Dispense: 90 tablet; Refill: 3  -     busPIRone (BUSPAR) 7.5 MG tablet; Take 1 tablet (7.5 mg total) by mouth 2 (two) times a day.  Dispense: 180 tablet; Refill: 3  Stable continue current medication regimen   5. Fatigue, unspecified type  -     CBC Auto Differential; Future; Expected date: 12/26/2024  -     TSH; Future; Expected date: 12/26/2024       Follow up in about 1 year (around 12/26/2025) for your next annual wellness visit.      Future Appointments       Date Provider Specialty Appt Notes    12/29/2025 Cate Jara NP Family Medicine annual

## 2025-05-09 ENCOUNTER — TELEPHONE (OUTPATIENT)
Dept: FAMILY MEDICINE | Facility: CLINIC | Age: 40
End: 2025-05-09
Payer: MEDICAID

## 2025-05-09 NOTE — TELEPHONE ENCOUNTER
Called patient to try to schedule her for a pap smear, was not able to reach patient, left voice mail instructing patient to call us back.